# Patient Record
Sex: MALE | Race: WHITE | NOT HISPANIC OR LATINO | URBAN - METROPOLITAN AREA
[De-identification: names, ages, dates, MRNs, and addresses within clinical notes are randomized per-mention and may not be internally consistent; named-entity substitution may affect disease eponyms.]

---

## 2024-01-18 ENCOUNTER — EMERGENCY (EMERGENCY)
Facility: HOSPITAL | Age: 34
LOS: 1 days | Discharge: HOME | End: 2024-01-18
Attending: EMERGENCY MEDICINE | Admitting: EMERGENCY MEDICINE
Payer: COMMERCIAL

## 2024-01-18 VITALS
TEMPERATURE: 98 F | WEIGHT: 158.07 LBS | OXYGEN SATURATION: 98 % | HEART RATE: 90 BPM | SYSTOLIC BLOOD PRESSURE: 137 MMHG | DIASTOLIC BLOOD PRESSURE: 85 MMHG | RESPIRATION RATE: 16 BRPM | HEIGHT: 74 IN

## 2024-01-18 VITALS
HEART RATE: 64 BPM | TEMPERATURE: 97 F | SYSTOLIC BLOOD PRESSURE: 108 MMHG | DIASTOLIC BLOOD PRESSURE: 68 MMHG | OXYGEN SATURATION: 98 % | RESPIRATION RATE: 18 BRPM

## 2024-01-18 DIAGNOSIS — R74.01 ELEVATION OF LEVELS OF LIVER TRANSAMINASE LEVELS: ICD-10-CM

## 2024-01-18 DIAGNOSIS — M54.9 DORSALGIA, UNSPECIFIED: ICD-10-CM

## 2024-01-18 DIAGNOSIS — K76.0 FATTY (CHANGE OF) LIVER, NOT ELSEWHERE CLASSIFIED: ICD-10-CM

## 2024-01-18 DIAGNOSIS — Z88.0 ALLERGY STATUS TO PENICILLIN: ICD-10-CM

## 2024-01-18 DIAGNOSIS — D17.39 BENIGN LIPOMATOUS NEOPLASM OF SKIN AND SUBCUTANEOUS TISSUE OF OTHER SITES: ICD-10-CM

## 2024-01-18 LAB
ALBUMIN SERPL ELPH-MCNC: 4.6 G/DL — SIGNIFICANT CHANGE UP (ref 3.3–5)
ALP SERPL-CCNC: 55 U/L — SIGNIFICANT CHANGE UP (ref 40–120)
ALT FLD-CCNC: 60 U/L — HIGH (ref 10–45)
ANION GAP SERPL CALC-SCNC: 9 MMOL/L — SIGNIFICANT CHANGE UP (ref 5–17)
AST SERPL-CCNC: 27 U/L — SIGNIFICANT CHANGE UP (ref 10–40)
BASOPHILS # BLD AUTO: 0.02 K/UL — SIGNIFICANT CHANGE UP (ref 0–0.2)
BASOPHILS NFR BLD AUTO: 0.2 % — SIGNIFICANT CHANGE UP (ref 0–2)
BILIRUB SERPL-MCNC: 0.6 MG/DL — SIGNIFICANT CHANGE UP (ref 0.2–1.2)
BUN SERPL-MCNC: 17 MG/DL — SIGNIFICANT CHANGE UP (ref 7–23)
CALCIUM SERPL-MCNC: 10.1 MG/DL — SIGNIFICANT CHANGE UP (ref 8.4–10.5)
CHLORIDE SERPL-SCNC: 103 MMOL/L — SIGNIFICANT CHANGE UP (ref 96–108)
CO2 SERPL-SCNC: 27 MMOL/L — SIGNIFICANT CHANGE UP (ref 22–31)
CREAT SERPL-MCNC: 0.84 MG/DL — SIGNIFICANT CHANGE UP (ref 0.5–1.3)
EGFR: 117 ML/MIN/1.73M2 — SIGNIFICANT CHANGE UP
EOSINOPHIL # BLD AUTO: 0.06 K/UL — SIGNIFICANT CHANGE UP (ref 0–0.5)
EOSINOPHIL NFR BLD AUTO: 0.7 % — SIGNIFICANT CHANGE UP (ref 0–6)
GLUCOSE SERPL-MCNC: 108 MG/DL — HIGH (ref 70–99)
HCT VFR BLD CALC: 44.5 % — SIGNIFICANT CHANGE UP (ref 39–50)
HGB BLD-MCNC: 15.3 G/DL — SIGNIFICANT CHANGE UP (ref 13–17)
IMM GRANULOCYTES NFR BLD AUTO: 0.2 % — SIGNIFICANT CHANGE UP (ref 0–0.9)
LYMPHOCYTES # BLD AUTO: 1.76 K/UL — SIGNIFICANT CHANGE UP (ref 1–3.3)
LYMPHOCYTES # BLD AUTO: 19.6 % — SIGNIFICANT CHANGE UP (ref 13–44)
MCHC RBC-ENTMCNC: 30.2 PG — SIGNIFICANT CHANGE UP (ref 27–34)
MCHC RBC-ENTMCNC: 34.4 GM/DL — SIGNIFICANT CHANGE UP (ref 32–36)
MCV RBC AUTO: 87.8 FL — SIGNIFICANT CHANGE UP (ref 80–100)
MONOCYTES # BLD AUTO: 0.94 K/UL — HIGH (ref 0–0.9)
MONOCYTES NFR BLD AUTO: 10.5 % — SIGNIFICANT CHANGE UP (ref 2–14)
NEUTROPHILS # BLD AUTO: 6.17 K/UL — SIGNIFICANT CHANGE UP (ref 1.8–7.4)
NEUTROPHILS NFR BLD AUTO: 68.8 % — SIGNIFICANT CHANGE UP (ref 43–77)
NRBC # BLD: 0 /100 WBCS — SIGNIFICANT CHANGE UP (ref 0–0)
PLATELET # BLD AUTO: 241 K/UL — SIGNIFICANT CHANGE UP (ref 150–400)
POTASSIUM SERPL-MCNC: 4 MMOL/L — SIGNIFICANT CHANGE UP (ref 3.5–5.3)
POTASSIUM SERPL-SCNC: 4 MMOL/L — SIGNIFICANT CHANGE UP (ref 3.5–5.3)
PROT SERPL-MCNC: 7.2 G/DL — SIGNIFICANT CHANGE UP (ref 6–8.3)
RBC # BLD: 5.07 M/UL — SIGNIFICANT CHANGE UP (ref 4.2–5.8)
RBC # FLD: 12.2 % — SIGNIFICANT CHANGE UP (ref 10.3–14.5)
SODIUM SERPL-SCNC: 139 MMOL/L — SIGNIFICANT CHANGE UP (ref 135–145)
WBC # BLD: 8.97 K/UL — SIGNIFICANT CHANGE UP (ref 3.8–10.5)
WBC # FLD AUTO: 8.97 K/UL — SIGNIFICANT CHANGE UP (ref 3.8–10.5)

## 2024-01-18 PROCEDURE — 71270 CT THORAX DX C-/C+: CPT | Mod: 26,MA

## 2024-01-18 PROCEDURE — 71270 CT THORAX DX C-/C+: CPT | Mod: MA

## 2024-01-18 PROCEDURE — 99285 EMERGENCY DEPT VISIT HI MDM: CPT

## 2024-01-18 PROCEDURE — 80053 COMPREHEN METABOLIC PANEL: CPT

## 2024-01-18 PROCEDURE — 96365 THER/PROPH/DIAG IV INF INIT: CPT | Mod: XU

## 2024-01-18 PROCEDURE — 36415 COLL VENOUS BLD VENIPUNCTURE: CPT

## 2024-01-18 PROCEDURE — 85025 COMPLETE CBC W/AUTO DIFF WBC: CPT

## 2024-01-18 PROCEDURE — 99284 EMERGENCY DEPT VISIT MOD MDM: CPT | Mod: 25

## 2024-01-18 RX ORDER — ACETAMINOPHEN 500 MG
1000 TABLET ORAL ONCE
Refills: 0 | Status: COMPLETED | OUTPATIENT
Start: 2024-01-18 | End: 2024-01-18

## 2024-01-18 RX ADMIN — Medication 1000 MILLIGRAM(S): at 12:13

## 2024-01-18 RX ADMIN — Medication 400 MILLIGRAM(S): at 10:45

## 2024-01-18 NOTE — ED ADULT TRIAGE NOTE - CHIEF COMPLAINT QUOTE
Pt c/o sharp L shoulder pain since yesterday. pt states a "lump started to form yesterday on the left shoulder". Pt c/o worsening pain a the site. Pt took 600mg of Motrin at 8am. Pt denies any fever, chills, N/V/D, CP, or SOB.

## 2024-01-18 NOTE — ED PROVIDER NOTE - CLINICAL SUMMARY MEDICAL DECISION MAKING FREE TEXT BOX
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  Patient denies any trauma, heavy exercising recently.  Pain has gotten progressively worse throughout the day and is exacerbated by certain types of movement and including bringing his left arm forward.  No fevers, chills, chest pain, shortness of breath, abdominal pain, nausea or vomiting, n/t/w...  Patient tried taking ibuprofen and Tylenol with no relief.  Patient went to urgent care yesterday and was given a prescription for CT chest however patient presented to ED due to increased pain. Afebrile. HDS. Ddx includes lipoma, muscle tear w/ hematoma, cyst, abscess. Will check labs and obtain ct chest to further evaluate area of swelling. Will give tylenol for pain. Labs reassuring with normal wbc, hg/hct. Minimal elev of alt which is known due to fatty liver as per pt. CT chest results noted below:    1. Lipoma in the subcutaneous tissues of the left upper back, of   questionable clinical significance.  2. Hepatic steatosis.    ED evaluation and management discussed with the patient and father in detail.  Close PMD and surg follow up encouraged.  Strict ED return instructions discussed in detail and patient given the opportunity to ask any questions about their discharge diagnosis and instructions. Patient verbalized understanding.

## 2024-01-18 NOTE — ED PROVIDER NOTE - PHYSICAL EXAMINATION
VITAL SIGNS: I have reviewed nursing notes and confirm.  CONSTITUTIONAL: Well appearing, in no acute distress.   SKIN:  warm and dry, no acute rash.   HEAD:  normocephalic, atraumatic.  EYES: EOM intact; conjunctiva and sclera clear.  ENT: No nasal discharge; airway clear.   NECK: Supple.  CARD: S1, S2, Regular rate and rhythm.   RESP:  Clear to auscultation b/l, no wheezes, rales or rhonchi.  ABD: Normal bowel sounds; soft; non-distended; non-tender; no guarding/ rebound.  BACK: + area of swelling and tenderness to left upper back, soft, no fluctuance, no overlying erythema, warmth. No midline tenderness.   EXT: Normal ROM. No peripheral edema. Pulses intact and equal b/l.  NEURO: Alert, oriented, grossly unremarkable  PSYCH: Cooperative, mood and affect appropriate.

## 2024-01-18 NOTE — ED ADULT NURSE NOTE - GENITOURINARY ASSESSMENT
Subjective:      Patient ID: Maura Singh is a 76 y.o. female.    Chief Complaint: Shortness of Breath; Cough (Productive); and Nasal Congestion    Ms. Singh was brought in by her daughter to Urgent Care today with complaints of cough. She has been having a cough for about 2 weeks with progressive worsening. She does admit to some wheezing, but she has a history of wheezing. She denies leg swelling, palpitations, CP, calf tenderness, recent hospitalizations, or long periods of immobility. She is a former smoker (quit >10 years ago). She took Tylenol cold and cough with no relief. BP is elevated. She was prescribed Lisinopril/HCTZ, but had some side effects, so she stopped taking this medication. +FRAGOSO. No orthopnea.       Cough   This is a new problem. The current episode started 1 to 4 weeks ago (2 weeks). The problem has been gradually worsening. The problem occurs constantly. The cough is productive of sputum. Associated symptoms include chills, a fever (felt feverish intermittently since yesterday, but doesn't think she ever ran an elevated temp), nasal congestion, shortness of breath and wheezing. Pertinent negatives include no chest pain. Nothing aggravates the symptoms.     Review of Systems   Constitutional: Positive for chills and fever (felt feverish intermittently since yesterday, but doesn't think she ever ran an elevated temp).   HENT: Positive for congestion and sinus pressure.    Respiratory: Positive for cough, shortness of breath and wheezing.    Cardiovascular: Negative for chest pain, palpitations and leg swelling (always has some mild swelling, but this hasn't been any worse than her baseline).   Gastrointestinal: Negative.    Musculoskeletal: Negative.    Skin: Negative.    Neurological: Negative.    Hematological: Negative.        Objective:   BP (!) 152/102 (BP Location: Right arm, Patient Position: Sitting, BP Method: Large (Manual))   Pulse 88   Temp 98.2 °F (36.8 °C) (Tympanic)    "Ht 5' 2" (1.575 m)   Wt 98.3 kg (216 lb 11.4 oz)   SpO2 (!) 93%   BMI 39.64 kg/m²   Physical Exam   Constitutional: She is oriented to person, place, and time. She is cooperative. No distress.   HENT:   Head: Normocephalic and atraumatic.   Right Ear: Decreased hearing is noted.   Left Ear: Decreased hearing (deaf in left ear) is noted.   Nose: Mucosal edema present. No sinus tenderness.   Mouth/Throat: Uvula is midline and mucous membranes are normal. Posterior oropharyngeal erythema present. No oropharyngeal exudate or posterior oropharyngeal edema.   Neck: Normal range of motion. Neck supple.   Cardiovascular: Normal rate, regular rhythm, S1 normal, S2 normal and normal pulses.    1+ LE edema bilaterally (pt reports that this is baseline for her). No calf tenderness. Normal peripheral pulses.   Pulmonary/Chest: Effort normal. No accessory muscle usage. No tachypnea (RR = 20). No respiratory distress. She has decreased breath sounds (diminished throughout). She has wheezes (faint exp wheezing throughout all lung fields). She has no rhonchi. She has no rales.   Lymphadenopathy:     She has no cervical adenopathy.        Right: No supraclavicular adenopathy present.        Left: No supraclavicular adenopathy present.   Neurological: She is alert and oriented to person, place, and time.   Skin: Skin is warm and dry. No rash noted. She is not diaphoretic.   Nursing note and vitals reviewed.    Assessment:      1. Shortness of breath    2. Wheezing    3. Bacterial lower respiratory infection       Plan:   Shortness of breath  Comments:  Go immediately to the ER or call 911 if oxygen level is below 88%. Follow up within the next 2 days with primary care.   Orders:  -     X-Ray Chest PA And Lateral; Future; Expected date: 12/13/2017  -     furosemide (LASIX) 20 MG tablet; Take 1 tablet (20 mg total) by mouth once daily.  Dispense: 3 tablet; Refill: 0    Wheezing  -     betamethasone acetate-betamethasone sodium " phosphate injection 6 mg; Inject 1 mL (6 mg total) into the muscle one time.  -     albuterol-ipratropium 2.5mg-0.5mg/3mL nebulizer solution 3 mL; Take 3 mLs by nebulization one time.  -     X-Ray Chest PA And Lateral; Future; Expected date: 12/13/2017  -     predniSONE (DELTASONE) 20 MG tablet; Take 1 tablet (20 mg total) by mouth once daily.  Dispense: 5 tablet; Refill: 0  -     albuterol 90 mcg/actuation inhaler; Inhale 1-2 puffs into the lungs every 4 (four) hours as needed for Wheezing or Shortness of Breath (cough).  Dispense: 1 Inhaler; Refill: 0    Bacterial lower respiratory infection  -     doxycycline (VIBRA-TABS) 100 MG tablet; Take 1 tablet (100 mg total) by mouth every 12 (twelve) hours.  Dispense: 20 tablet; Refill: 0    We placed Ms. Singh on 2L O2 via NC upon finding O2 sat 84%. Sat improved to low 90's which is apparently her baseline based on reports from daughter.  Ms. Singh had a good bit of improvement after first neb treatment upon reassessment. Air movement was better and she reported relief in SOB. After second treatment and steroid, she stated that she felt a lot better.   We reviewed her chest X-ray and findings consistent with possible heart failure, although, no evidence of acute exacerbation. She is very resistant to going to the ER.  She is aware that hypoxia can cause confusion that could prevent her from being able to notify someone of a need for help. She states that she lives with many people and her daughter has a pulse ox monitor, so they would like to try and see if she can keep her oxygen up enough to avoid the hospital. O2 sat on room air was between 89-93% and she denied any SOB on RA. We reviewed at length that if the oxygen level drops below 88% or if she feels any worsening at all, she should go immediately to the ER or call 911. She verbalizes understanding. Ms. Singh's daughter was present throughout the entire visit and will keep a close eye on her mom and  agreed to take her to the ER if anything worsens.  Will also try 3 days of Lasix. She took lisinopril/hctz about one year ago and didn't tolerate it very well, so will have to get her back in to primary care to address the BP, although this improved after SOB resolved.  Close follow up with primary care for recheck.   Instructions, follow up, and supportive care as per AVS.         - - -

## 2024-01-18 NOTE — ED PROVIDER NOTE - PATIENT PORTAL LINK FT
You can access the FollowMyHealth Patient Portal offered by [facility] by registering at the following website: [email or web address]. By joining [facility]’s FollowMyHealth portal, you will also be able to view your health information using other applications (apps) compatible with our system.

## 2024-01-18 NOTE — ED PROVIDER NOTE - NEURO NEGATIVE STATEMENT, MLM
no loss of consciousness, no gait abnormality, no headache, no sensory deficits, and no weakness.
The patient has been re-examined and I agree with the above assessment or I updated with my findings.

## 2024-01-18 NOTE — ED ADULT NURSE NOTE - OBJECTIVE STATEMENT
Pt is 33 y.o male pt walked in here fir lump on the L upper back noticed yesterday. Denies trauma or injury. Pt was seen at  and was sent here for further eval. No known PMhx. Pt A&Ox4, conversive in full sentences and ambulatory. Denies CP, sob, fever or chills. IV inserted and labs drawn. Assessment ongoing.

## 2024-01-18 NOTE — ED PROVIDER NOTE - OBJECTIVE STATEMENT
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  Patient denies any trauma, heavy exercising recently.  Pain has gotten progressively worse throughout the day and is exacerbated by certain types of movement and including bringing his left arm forward.  No fevers, chills, chest pain, shortness of breath, abdominal pain, nausea or vomiting, n/t/w...  Patient tried taking ibuprofen and Tylenol with no relief.  Patient went to urgent care yesterday and was given a prescription for CT chest however patient presented to ED due to increased pain.

## 2024-01-18 NOTE — ED PROVIDER NOTE - NSFOLLOWUPINSTRUCTIONS_ED_ALL_ED_FT
Take ibuprofen or Aleve as needed for pain.  Follow-up with a primary care doctor and general surgeon regarding management of your lipoma. Return to ER for any new or worsening symptoms.    Lipoma  A person with a benign tumor (lipoma) on the shoulder and neck area.  A lipoma is a noncancerous (benign) tumor that is made up of fat cells. This is a very common type of soft-tissue growth. Lipomas are usually found under the skin (subcutaneous). They may occur in any tissue of the body that contains fat. Common areas for lipomas to appear include the back, arms, shoulders, buttocks, and thighs.    Lipomas grow slowly, and they are usually painless. Most lipomas do not cause problems and do not require treatment.    What are the causes?  The cause of this condition is not known.    What increases the risk?  You are more likely to develop this condition if:  You are 40–60 years old.  You have a family history of lipomas.  What are the signs or symptoms?  A lipoma usually appears as a small, round bump under the skin. In most cases, the lump will:  Feel soft or rubbery.  Not cause pain or other symptoms.  However, if a lipoma is located in an area where it pushes on nerves, it can become painful or cause other symptoms.    How is this diagnosed?  A lipoma can usually be diagnosed with a physical exam. You may also have tests to confirm the diagnosis and to rule out other conditions. Tests may include:  Imaging tests, such as a CT scan or an MRI.  Removal of a tissue sample to be looked at under a microscope (biopsy).  How is this treated?  Treatment for this condition depends on the size of the lipoma and whether it is causing any symptoms.  For small lipomas that are not causing problems, no treatment is needed.  If a lipoma is bigger or it causes problems, surgery may be done to remove the lipoma. Lipomas can also be removed to improve appearance. Most often, the procedure is done after applying a medicine that numbs the area (local anesthetic).  Liposuction may be done to reduce the size of the lipoma before it is removed through surgery, or it may be done to remove the lipoma. Lipomas are removed with this method to limit incision size and scarring. A liposuction tube is inserted through a small incision into the lipoma, and the contents of the lipoma are removed through the tube with suction.  Follow these instructions at home:  Watch your lipoma for any changes.  Keep all follow-up visits. This is important.  Where to find more information  OrthoInfo: orthoinfo.aa[employer].org  Contact a health care provider if:  Your lipoma becomes larger or hard.  Your lipoma becomes painful, red, or increasingly swollen. These could be signs of infection or a more serious condition.  Get help right away if:  You develop tingling or numbness in an area near the lipoma. This could indicate that the lipoma is causing nerve damage.  Summary  A lipoma is a noncancerous tumor that is made up of fat cells.  Most lipomas do not cause problems and do not require treatment.  If a lipoma is bigger or it causes problems, surgery may be done to remove the lipoma.  Contact a health care provider if your lipoma becomes larger or hard, or if it becomes painful, red, or increasingly swollen. These could be signs of infection or a more serious condition.  This information is not intended to replace advice given to you by your health care provider. Make sure you discuss any questions you have with your health care provider.    Document Revised: 01/06/2023 Document Reviewed: 01/06/2023  Elsevier Patient Education © 2023 [employer]